# Patient Record
Sex: MALE | Race: WHITE
[De-identification: names, ages, dates, MRNs, and addresses within clinical notes are randomized per-mention and may not be internally consistent; named-entity substitution may affect disease eponyms.]

---

## 2022-07-18 ENCOUNTER — HOSPITAL ENCOUNTER (EMERGENCY)
Dept: HOSPITAL 26 - MED | Age: 22
Discharge: LEFT BEFORE DISCHARGE | End: 2022-07-18
Payer: COMMERCIAL

## 2022-07-18 DIAGNOSIS — Z53.21: ICD-10-CM

## 2022-07-18 DIAGNOSIS — R10.9: Primary | ICD-10-CM

## 2022-07-18 NOTE — NUR
PATIENT CALL TO TRIAGE , NO RESPONSE

PATIENT LEFT WITHOUT BEING SEEN BY DR. STEPHENS. NO FURTHER CARE PROVIDED FOR 
PATIENT.